# Patient Record
Sex: FEMALE | Race: WHITE | URBAN - METROPOLITAN AREA
[De-identification: names, ages, dates, MRNs, and addresses within clinical notes are randomized per-mention and may not be internally consistent; named-entity substitution may affect disease eponyms.]

---

## 2024-02-26 ENCOUNTER — TELEPHONE (OUTPATIENT)
Dept: BARIATRICS/WEIGHT MGMT | Facility: CLINIC | Age: 59
End: 2024-02-26

## 2024-09-11 ENCOUNTER — OFFICE VISIT (OUTPATIENT)
Dept: ORTHOPEDIC SURGERY | Facility: CLINIC | Age: 59
End: 2024-09-11
Payer: COMMERCIAL

## 2024-09-11 VITALS — HEIGHT: 63 IN | BODY MASS INDEX: 46.25 KG/M2 | OXYGEN SATURATION: 98 % | WEIGHT: 261 LBS | RESPIRATION RATE: 20 BRPM

## 2024-09-11 DIAGNOSIS — M17.0 BILATERAL PRIMARY OSTEOARTHRITIS OF KNEE: ICD-10-CM

## 2024-09-11 DIAGNOSIS — G89.29 CHRONIC PAIN OF RIGHT KNEE: Primary | ICD-10-CM

## 2024-09-11 DIAGNOSIS — M25.561 CHRONIC PAIN OF RIGHT KNEE: Primary | ICD-10-CM

## 2024-09-11 PROCEDURE — 99203 OFFICE O/P NEW LOW 30 MIN: CPT | Performed by: NURSE PRACTITIONER

## 2024-09-11 RX ORDER — DULOXETINE HCL 60 MG
1 CAPSULE,DELAYED RELEASE (ENTERIC COATED) ORAL EVERY 24 HOURS
COMMUNITY

## 2024-09-11 RX ORDER — LEVOTHYROXINE SODIUM 75 MCG
TABLET ORAL EVERY 24 HOURS
COMMUNITY

## 2024-09-11 RX ORDER — HYDROCODONE BITARTRATE AND ACETAMINOPHEN 10; 325 MG/1; MG/1
TABLET ORAL EVERY 8 HOURS SCHEDULED
COMMUNITY
Start: 2024-08-14 | End: 2024-09-13

## 2024-09-11 RX ORDER — ZONISAMIDE 100 MG/1
CAPSULE ORAL EVERY 8 HOURS SCHEDULED
COMMUNITY

## 2024-09-11 RX ORDER — ROSUVASTATIN CALCIUM 20 MG/1
1 TABLET, COATED ORAL DAILY
COMMUNITY

## 2024-09-11 RX ORDER — AZELASTINE HYDROCHLORIDE 137 UG/1
1 SPRAY, METERED NASAL 2 TIMES DAILY
COMMUNITY
Start: 2024-07-05

## 2024-09-11 RX ORDER — BUSPIRONE HYDROCHLORIDE 10 MG/1
TABLET ORAL
COMMUNITY

## 2024-09-11 RX ORDER — DAPAGLIFLOZIN 10 MG/1
TABLET, FILM COATED ORAL
COMMUNITY

## 2024-09-11 RX ORDER — LORATADINE 10 MG/1
TABLET ORAL EVERY 24 HOURS
COMMUNITY

## 2024-09-11 RX ORDER — GABAPENTIN 400 MG/1
CAPSULE ORAL
COMMUNITY
Start: 2024-08-27

## 2024-09-11 RX ORDER — BUPROPION HCL 150 MG
TABLET, EXTENDED RELEASE 24 HR ORAL EVERY 24 HOURS
COMMUNITY

## 2024-09-11 RX ORDER — ALBUTEROL SULFATE 90 UG/1
AEROSOL, METERED RESPIRATORY (INHALATION)
COMMUNITY

## 2024-09-11 RX ORDER — MOMETASONE FUROATE MONOHYDRATE 50 UG/1
SPRAY, METERED NASAL
COMMUNITY
Start: 2024-07-06

## 2024-09-11 RX ORDER — TIRZEPATIDE 10 MG/.5ML
INJECTION, SOLUTION SUBCUTANEOUS
COMMUNITY

## 2024-09-11 RX ORDER — FUROSEMIDE 20 MG
TABLET ORAL
COMMUNITY

## 2024-09-11 RX ORDER — DICLOFENAC SODIUM 100 MG/1
TABLET, FILM COATED, EXTENDED RELEASE ORAL EVERY 24 HOURS
COMMUNITY
Start: 2024-09-04 | End: 2024-09-14

## 2024-09-11 RX ORDER — RAMELTEON 8 MG/1
TABLET ORAL EVERY 24 HOURS
COMMUNITY

## 2024-09-11 RX ORDER — PREDNISONE 20 MG/1
TABLET ORAL EVERY 24 HOURS
COMMUNITY
Start: 2024-09-03 | End: 2024-09-14

## 2024-09-11 RX ORDER — METOCLOPRAMIDE 5 MG/1
TABLET ORAL
COMMUNITY

## 2024-09-11 RX ORDER — FAMOTIDINE 40 MG/1
TABLET, FILM COATED ORAL EVERY 24 HOURS
COMMUNITY

## 2024-09-11 RX ORDER — METOPROLOL SUCCINATE 100 MG/1
TABLET, EXTENDED RELEASE ORAL
COMMUNITY

## 2024-09-11 NOTE — PROGRESS NOTES
[facility]        Patient Name: Zara Batista  : 1965  Primary Care Physician: Ankit Nicholson MD        Chief Complaint:    Chief Complaint   Patient presents with    Left Knee - Initial Evaluation     Increased pain for the last 6 weeks    XR @ [place]    Constant pain         HPI:   Zara Batista is a 59 y.o. year old who presents today for evaluation of bilateral knee pain.    Onset was years ago and pain seems to be progressing. She has been treated by an outside provider in the past with steroid injections as well as VISCO. Her last VISCO injection was 2+ years ago.     She states the steroids were not long lasting. The VISCO injections worked really well.     She has also tried OTC pain meds, knee braces and weigh tloss. She has lost 150+ lbs.           Past Medical/Surgical, Social and Family History:  I have reviewed and/or updated pertinent history as noted in the medical record including:  Past Medical History:   Diagnosis Date    Arthritis of back 2014    About this date    Arthritis of neck     Knee swelling     Low back strain     Lumbosacral disc disease     Neck strain      History reviewed. No pertinent surgical history.  Social History     Occupational History    Not on file   Tobacco Use    Smoking status: Never     Passive exposure: Never    Smokeless tobacco: Never    Tobacco comments:     Vape on occasion   Vaping Use    Vaping status: Never Used   Substance and Sexual Activity    Alcohol use: Not Currently     Comment: Maybe on special holidays    Drug use: Never    Sexual activity: Yes     Partners: Male     Birth control/protection: Condom          Allergies:   Allergies   Allergen Reactions    Ibuprofen Swelling    Dulaglutide Palpitations       Medications:   Home Medications:  Current Outpatient Medications on File Prior to Visit   Medication Sig    albuterol sulfate  (90 Base) MCG/ACT inhaler Every 4 (Four) Hours.    amoxicillin-clavulanate  (AUGMENTIN) 875-125 MG per tablet Every 12 (Twelve) Hours.    Azelastine HCl 137 MCG/SPRAY solution 1 spray by Each Nare route 2 (Two) Times a Day.    busPIRone (BUSPAR) 10 MG tablet TAKE 1 TABLET BY MOUTH THREE TIMES DAILY for 30    Claritin 10 MG tablet Daily.    Cymbalta 60 MG capsule 1 capsule Daily.    dapagliflozin Propanediol 10 MG tablet Take 1 tablet by mouth once daily for 30    diclofenac sodium (VOTAREN XR) 100 MG 24 hr tablet Daily.    famotidine (PEPCID) 40 MG tablet Daily.    furosemide (LASIX) 20 MG tablet Take 1 tablet by mouth once daily for 30    gabapentin (NEURONTIN) 400 MG capsule TAKE 1 CAPSULE BY MOUTH THREE TIMES DAILY for 30    HYDROcodone-acetaminophen (NORCO)  MG per tablet Every 8 (Eight) Hours.    metoclopramide (REGLAN) 5 MG tablet TAKE 1 TABLET BY MOUTH BEFORE MEAL(S) AND AT BEDTIME for 60    metoprolol succinate XL (TOPROL-XL) 100 MG 24 hr tablet Take 1 tablet by mouth once daily for 30    mometasone (NASONEX) 50 MCG/ACT nasal spray USE TWO SPRAYS IN EACH NOSTRIL ONCE DAILY    Mounjaro 10 MG/0.5ML solution pen-injector pen INJECT 1 SYRINGE SUBCUTANEOUSLY ONCE A WEEK for 28    predniSONE (DELTASONE) 20 MG tablet Daily.    ramelteon (Rozerem) 8 MG tablet Daily.    rosuvastatin (CRESTOR) 20 MG tablet Take 1 tablet by mouth Daily.    Singulair 10 MG tablet Daily.    Synthroid 75 MCG tablet Daily.    tiZANidine (ZANAFLEX) 4 MG tablet Take 1 tablet by mouth once daily for 30    Wellbutrin  MG 24 hr tablet Daily.    zonisamide (Zonegran) 100 MG capsule Every 8 (Eight) Hours.     No current facility-administered medications on file prior to visit.         ROS:  Negative unless listed in the HPI    Physical Exam:   59 y.o. female  Body mass index is 46.23 kg/m²., 118 kg (261 lb)  Vitals:    09/11/24 1239   Resp: 20   SpO2: 98%     General: Alert, cooperative, appears well and in no observable distress.   HEENT: Normocephalic, atraumatic on external visual inspection. No icterus.    CV: No significant peripheral edema.    Respiratory: Normal respiratory effort.   Skin: Warm & well perfused; appropriate skin turgor.  Psych: Appropriate mood & affect.  Neuro: Gross sensation and motor intact in affected extremity/extremities.  Vascular: Peripheral pulses palpable in affected extremity/extremities.     Ortho Exam     Left/right knee. Patient has crepitus throughout range of motion. Positive patellar grind test. Mild effusion.    Significant joint line tenderness is noted on the medial aspect of the knee. Patient has a varus orientation of the knee. There is fullness and tenderness in the Popliteal fossa. Mild distention of a Popliteal cyst is noted in this location. Range of motion in flexion is from 0-110 degrees.    Patient's gait is cautious and antalgic. Skin and soft tissues are mildly swollen, consistent with synovitis and effusion. The patient has a significant limp with the first few steps after starting the gait cycle. Getting out of a chair takes a lot of effort due to pain on knee flexion.     Radiology:  X-Ray Report:  Bilateral knee(s) X-Ray  Indication: Evaluation of pain  AP, Lateral views  Findings: advanced degenerative changes noted, most pronounced in the medial and PF compartments   Bony lesion: no  Soft tissues: within normal limits  Joint spaces: decreased  Hardware appropriately positioned: not applicable  Prior studies available for comparison: no         Assessment:  Osteoarthritis, bilateral knees   Body mass index is 46.23 kg/m².  BMI consistent with Obese Class III extreme obesity: > or equal to 40kg/m2  Class 3 Severe Obesity (BMI >=40). Obesity-related health conditions include the following: osteoarthritis. Obesity is improving with lifestyle modifications. BMI is is above average; BMI management plan is completed. We discussed portion control and increasing exercise.        Plan:  I have reviewed the above imaging with the patient    Past treatments include:   OTC  pain meds, knee braces, weight loss, HEP, steroid injections, VISCO. She cannot tolerate NSAIDs    VISCO was the only meaningful relief she received. Will plan to resume.     Follow up in 2-3 weeks with bilateral injections     Patient encouraged to call with any questions or concerns in the interim    OPAL Roa

## 2024-09-12 ENCOUNTER — PATIENT ROUNDING (BHMG ONLY) (OUTPATIENT)
Dept: ORTHOPEDIC SURGERY | Facility: CLINIC | Age: 59
End: 2024-09-12
Payer: COMMERCIAL

## 2024-10-08 ENCOUNTER — TELEPHONE (OUTPATIENT)
Dept: ORTHOPEDIC SURGERY | Facility: CLINIC | Age: 59
End: 2024-10-08

## 2024-10-08 NOTE — TELEPHONE ENCOUNTER
Caller: MAURO    Relationship to patient:     Best call back number: [phone] OPT 2    Patient is needing: MAURO IS CALLING BECAUSE THE OFFICE IS NEEDING TO SEND A PRIOR DETERMINATION TO THE INSURANCE FOR THE GEL INJECTIONS.. IT CAN BE DONE OVER THE PHONE AT  [phone].. PLEASE ADVISE..

## 2024-10-30 ENCOUNTER — OFFICE VISIT (OUTPATIENT)
Dept: ORTHOPEDIC SURGERY | Facility: CLINIC | Age: 59
End: 2024-10-30
Payer: COMMERCIAL

## 2024-10-30 VITALS — OXYGEN SATURATION: 98 % | RESPIRATION RATE: 20 BRPM | WEIGHT: 261 LBS | HEIGHT: 63 IN | BODY MASS INDEX: 46.25 KG/M2

## 2024-10-30 DIAGNOSIS — M17.0 BILATERAL PRIMARY OSTEOARTHRITIS OF KNEE: ICD-10-CM

## 2024-10-30 RX ORDER — HYDROCODONE BITARTRATE AND ACETAMINOPHEN 10; 325 MG/1; MG/1
1 TABLET ORAL 3 TIMES DAILY
COMMUNITY
Start: 2024-09-27

## 2024-10-30 RX ORDER — PREDNISONE 10 MG/1
1 TABLET ORAL DAILY
COMMUNITY
Start: 2024-10-21

## 2024-10-30 RX ORDER — LIDOCAINE HYDROCHLORIDE 10 MG/ML
2 INJECTION, SOLUTION INFILTRATION; PERINEURAL
Status: COMPLETED | OUTPATIENT
Start: 2024-10-30 | End: 2024-10-30

## 2024-10-30 RX ADMIN — LIDOCAINE HYDROCHLORIDE 2 ML: 10 INJECTION, SOLUTION INFILTRATION; PERINEURAL at 10:42

## 2024-10-30 NOTE — PROGRESS NOTES
INJECTION VISIT    Patient: Zara Batista    YOB: 1965    MRN: [id number]    Chief Complaint   Patient presents with    Right Knee - Follow-up    Left Knee - Follow-up       History of Present Illness:   Zara Batista is a 59 y.o. year old who presents today for injection of bilateral knees with Synvisc One.         Allergies:   Allergies   Allergen Reactions    Ibuprofen Swelling    Dulaglutide Palpitations       Medications:   Home Medications:  Current Outpatient Medications on File Prior to Visit   Medication Sig    albuterol sulfate  (90 Base) MCG/ACT inhaler Every 4 (Four) Hours.    Azelastine HCl 137 MCG/SPRAY solution 1 spray by Each Nare route 2 (Two) Times a Day.    busPIRone (BUSPAR) 10 MG tablet TAKE 1 TABLET BY MOUTH THREE TIMES DAILY for 30    Claritin 10 MG tablet Daily.    Cymbalta 60 MG capsule 1 capsule Daily.    dapagliflozin Propanediol 10 MG tablet Take 1 tablet by mouth once daily for 30    famotidine (PEPCID) 40 MG tablet Daily.    furosemide (LASIX) 20 MG tablet Take 1 tablet by mouth once daily for 30    gabapentin (NEURONTIN) 400 MG capsule TAKE 1 CAPSULE BY MOUTH THREE TIMES DAILY for 30    HYDROcodone-acetaminophen (NORCO)  MG per tablet Take 1 tablet by mouth 3 times a day.    metoclopramide (REGLAN) 5 MG tablet TAKE 1 TABLET BY MOUTH BEFORE MEAL(S) AND AT BEDTIME for 60    metoprolol succinate XL (TOPROL-XL) 100 MG 24 hr tablet Take 1 tablet by mouth once daily for 30    mometasone (NASONEX) 50 MCG/ACT nasal spray USE TWO SPRAYS IN EACH NOSTRIL ONCE DAILY    Mounjaro 10 MG/0.5ML solution pen-injector pen INJECT 1 SYRINGE SUBCUTANEOUSLY ONCE A WEEK for 28    predniSONE (DELTASONE) 10 MG tablet Take 1 tablet by mouth Daily.    ramelteon (Rozerem) 8 MG tablet Daily.    rosuvastatin (CRESTOR) 20 MG tablet Take 1 tablet by mouth Daily.    Singulair 10 MG tablet Daily.    Synthroid 75 MCG tablet Daily.    tiZANidine (ZANAFLEX) 4 MG tablet Take 1 tablet by mouth  once daily for 30    Wellbutrin  MG 24 hr tablet Daily.    zonisamide (Zonegran) 100 MG capsule Every 8 (Eight) Hours.     No current facility-administered medications on file prior to visit.         I have reviewed the patient's medical history in detail and updated the computerized patient record.  Review and summarization of old records include:    Past Medical History:   Diagnosis Date    Arthritis of back 2014    About this date    Arthritis of neck 2014    Knee swelling 2014    Low back strain 2014    Lumbosacral disc disease 2014    Neck strain 2014     No past surgical history on file.  Social History     Occupational History    Not on file   Tobacco Use    Smoking status: Never     Passive exposure: Never    Smokeless tobacco: Never    Tobacco comments:     Vape on occasion   Vaping Use    Vaping status: Some Days   Substance and Sexual Activity    Alcohol use: Not Currently     Comment: Maybe on special holidays    Drug use: Never    Sexual activity: Yes     Partners: Male     Birth control/protection: Condom      Social History     Social History Narrative    Not on file     History reviewed. No pertinent family history.            ROS  Negative unless listed in the HPI        Physical Exam  59 y.o. female  Body mass index is 46.23 kg/m²., 118 kg (261 lb)  Vitals:    10/30/24 1023   Resp: 20   SpO2: 98%     General: Alert, cooperative, appears well and in no observable distress.   HEENT: Normocephalic, atraumatic on external visual inspection. No icterus.   CV: No significant peripheral edema.   Respiratory: Normal respiratory effort.   Skin: Warm & well perfused; appropriate skin turgor.  Psych: Appropriate mood & affect.  Neuro: Gross sensation and motor intact in affected extremity/extremities.      Procedure:  Large Joint Arthrocentesis: R knee  Date/Time: 10/30/2024 10:42 AM  Consent given by: patient  Site marked: site marked  Timeout: Immediately prior to procedure a time out was called to  verify the correct patient, procedure, equipment, support staff and site/side marked as required   Supporting Documentation  Indications: pain and diagnostic evaluation   Procedure Details  Location: knee - R knee  Needle size: 18 G  Approach: anterolateral  Medications administered: 48 mg hylan 48 MG/6ML; 2 mL lidocaine 1 %  Patient tolerance: patient tolerated the procedure well with no immediate complications      Large Joint Arthrocentesis: L knee  Date/Time: 10/30/2024 10:42 AM  Consent given by: patient  Site marked: site marked  Timeout: Immediately prior to procedure a time out was called to verify the correct patient, procedure, equipment, support staff and site/side marked as required   Supporting Documentation  Indications: pain and diagnostic evaluation   Procedure Details  Location: knee - L knee  Needle size: 18 G  Approach: anterolateral  Medications administered: 48 mg hylan 48 MG/6ML; 2 mL lidocaine 1 %  Patient tolerance: patient tolerated the procedure well with no immediate complications            Assessment:   Diagnoses and all orders for this visit:    1. Bilateral primary osteoarthritis of knee  -     Visco Treatment              Plan:   -     Risks and benefits of injection therapy discussed with patient.    Injected patient's bilateral knee joint(s)with Synvisc One from an anterolateral approach   Compression/brace   Rest, ice, compression, and elevation (RICE) therapy  OTC Tylenol for pain PRN  BMI reviewed  Follow up in 6 months or PRN for repeat injection   Please call with questions or concerns in the interim        Date of encounter: 10/30/2024   OPAL Roa